# Patient Record
Sex: MALE | URBAN - METROPOLITAN AREA
[De-identification: names, ages, dates, MRNs, and addresses within clinical notes are randomized per-mention and may not be internally consistent; named-entity substitution may affect disease eponyms.]

---
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
## 2023-05-23 ENCOUNTER — APPOINTMENT (RX ONLY)
Dept: URBAN - METROPOLITAN AREA CLINIC 133 | Facility: CLINIC | Age: 23
Setting detail: DERMATOLOGY
End: 2023-05-23

## 2023-05-23 DIAGNOSIS — Z41.9 ENCOUNTER FOR PROCEDURE FOR PURPOSES OTHER THAN REMEDYING HEALTH STATE, UNSPECIFIED: ICD-10-CM

## 2023-05-23 PROCEDURE — ? INVENTORY

## 2023-05-23 PROCEDURE — ? MICRONEEDLING

## 2023-05-23 ASSESSMENT — LOCATION ZONE DERM: LOCATION ZONE: NOSE

## 2023-05-23 ASSESSMENT — LOCATION DETAILED DESCRIPTION DERM
LOCATION DETAILED: NASAL TIP
LOCATION DETAILED: NASAL SUPRATIP

## 2023-05-23 ASSESSMENT — LOCATION SIMPLE DESCRIPTION DERM: LOCATION SIMPLE: NOSE

## 2023-05-23 NOTE — PROCEDURE: MICRONEEDLING
Depth In Mm (Location #1): 0.5
Depth In Mm (Location #6): 2
Price (Use Numbers Only, No Special Characters Or $): 0
Consent: Written consent obtained, risks reviewed including but not limited to pain, scarring, infection and incomplete improvement.  Patient understands the procedure is cosmetic in nature and will require out of pocket payment.
Location #4: nose, upper lip and neck
Location #2: cheeks, chin
Depth In Mm (Location #5): 0.25
Topical Anesthesia?: 23% lidocaine, 7% tetracaine
Post-Care Instructions: -Do not use any skin care products such as moisturizer, cleanser, SPF, etc. for 12 hours post treatment. Avoid sun exposure.\\n-After 24 hours and up to 72 treat your skin as sensitive. Use a gentle cleanser, SPF with Zinc oxide. Do NOT use exfoliators such as (retinol, tretinoin, vitamin c, alpha-hydroxy acids, scrubs) during this time. \\n-Heavy exercise is not recommended for 24 hours. Sweat and heat will agitate the treatment area. \\n-Keep everything clean. Avoid hot tubs, swimming pools, and try not to touch. \\n-COVID: When required to use a facial mask, use a clean, dry mask, preferably paper/ disposable for 48 hours. Wash your hands frequently and prior to touching the treatment area. \\n-High Doe skin types may experience darkening of pigment prior to fading. \\n-Everyone?s reaction is different. Some may experience little to no discomfort, others may be sensitive, swollen and slough. Applying extra moisturizer multiple times daily will help to relieve discomfort. \\n***CONTACT THE OFFICE IMMEDIATELY if pain persists, or signs of infection such as: redness in the treatment, particularly if it spreads or forms a red streak, pus or oozing discharge, or fever occurs.\\n\\nExpected side-effects: \\nDay 1- immediately post treatment: mild to moderate redness, swelling. Skin will feel hot like a sunburn. Do not apply anything to your face (moisturizers, spf, cleansers, makeup etc) for at least 12 hours post procedure. \\n2 Days post treatment: few patients will experience continued redness or swelling. The skin may feel tight, itchy. Do your best not to touch, scratch or pick at your skin. Makeup may be applied. Wear SPF. Gentle cleanser and moisturizer may be applied. \\n3 Days post treatment: most patients will feel back to normal. Sensitive patients may experience itchiness and sensitivity, and mild redness. \\n4 - 7 days post treatment: most treatment side effects have subsided and appearance returned to normal. Regular skin regimen may be resumed. Always perform a patch test before using a product on the full treatment area. If tingling or stinging occurs, rinse with cool water and wait an additional 24 hours to try again.
Depth In Mm (Location #2): 1.25
Depth In Mm (Location #7): 0.1
Length Of Topical Anesthesia Application (Optional): 30 minutes
Location #1: forehead
Detail Level: Zone
Location #3: scars

## 2023-06-29 ENCOUNTER — APPOINTMENT (RX ONLY)
Dept: URBAN - METROPOLITAN AREA CLINIC 133 | Facility: CLINIC | Age: 23
Setting detail: DERMATOLOGY
End: 2023-06-29

## 2023-06-29 DIAGNOSIS — Z41.9 ENCOUNTER FOR PROCEDURE FOR PURPOSES OTHER THAN REMEDYING HEALTH STATE, UNSPECIFIED: ICD-10-CM

## 2023-06-29 PROCEDURE — ? INVENTORY

## 2023-06-29 PROCEDURE — ? MICRONEEDLING

## 2023-06-29 ASSESSMENT — LOCATION DETAILED DESCRIPTION DERM
LOCATION DETAILED: NASAL SUPRATIP
LOCATION DETAILED: NASAL DORSUM

## 2023-06-29 ASSESSMENT — LOCATION ZONE DERM: LOCATION ZONE: NOSE

## 2023-06-29 ASSESSMENT — LOCATION SIMPLE DESCRIPTION DERM: LOCATION SIMPLE: NOSE

## 2023-06-29 NOTE — PROCEDURE: MICRONEEDLING
Price (Use Numbers Only, No Special Characters Or $): 0
Depth In Mm (Location #4): 0.25
Location #2: nose
Post-Care Instructions: -Do not use any skin care products such as moisturizer, cleanser, SPF, etc. for 12 hours post treatment. Avoid sun exposure.\\n-After 24 hours and up to 72 treat your skin as sensitive. Use a gentle cleanser, SPF with Zinc oxide. Do NOT use exfoliators such as (retinol, tretinoin, vitamin c, alpha-hydroxy acids, scrubs) during this time. \\n-Heavy exercise is not recommended for 24 hours. Sweat and heat will agitate the treatment area. \\n-Keep everything clean. Avoid hot tubs, swimming pools, and try not to touch. \\n-COVID: When required to use a facial mask, use a clean, dry mask, preferably paper/ disposable for 48 hours. Wash your hands frequently and prior to touching the treatment area. \\n-High Doe skin types may experience darkening of pigment prior to fading. \\n-Everyone?s reaction is different. Some may experience little to no discomfort, others may be sensitive, swollen and slough. Applying extra moisturizer multiple times daily will help to relieve discomfort. \\n***CONTACT THE OFFICE IMMEDIATELY if pain persists, or signs of infection such as: redness in the treatment, particularly if it spreads or forms a red streak, pus or oozing discharge, or fever occurs.\\n\\nExpected side-effects: \\nDay 1- immediately post treatment: mild to moderate redness, swelling. Skin will feel hot like a sunburn. Do not apply anything to your face (moisturizers, spf, cleansers, makeup etc) for at least 12 hours post procedure. \\n2 Days post treatment: few patients will experience continued redness or swelling. The skin may feel tight, itchy. Do your best not to touch, scratch or pick at your skin. Makeup may be applied. Wear SPF. Gentle cleanser and moisturizer may be applied. \\n3 Days post treatment: most patients will feel back to normal. Sensitive patients may experience itchiness and sensitivity, and mild redness. \\n4 - 7 days post treatment: most treatment side effects have subsided and appearance returned to normal. Regular skin regimen may be resumed. Always perform a patch test before using a product on the full treatment area. If tingling or stinging occurs, rinse with cool water and wait an additional 24 hours to try again.
Topical Anesthesia?: 23% lidocaine, 7% tetracaine
Location #3: cheeks, chin, scars
Depth In Mm (Location #1): 1.25
Length Of Topical Anesthesia Application (Optional): 20 minutes
Depth In Mm (Location #6): 0.5
Consent: Written consent obtained, risks reviewed including but not limited to pain, scarring, infection and incomplete improvement.  Patient understands the procedure is cosmetic in nature and will require out of pocket payment.
Location #4: orbital eye area
Location #5: upper lip
Detail Level: Zone
Depth In Mm (Location #3): 2
Location #1: forehead
Depth In Mm (Location #7): 0.1

## 2023-08-01 ENCOUNTER — APPOINTMENT (RX ONLY)
Dept: URBAN - METROPOLITAN AREA CLINIC 133 | Facility: CLINIC | Age: 23
Setting detail: DERMATOLOGY
End: 2023-08-01

## 2023-08-01 DIAGNOSIS — Z41.9 ENCOUNTER FOR PROCEDURE FOR PURPOSES OTHER THAN REMEDYING HEALTH STATE, UNSPECIFIED: ICD-10-CM

## 2023-08-01 PROCEDURE — ? MICRONEEDLING

## 2023-08-01 PROCEDURE — ? INVENTORY

## 2023-08-01 ASSESSMENT — LOCATION SIMPLE DESCRIPTION DERM
LOCATION SIMPLE: LEFT FOREHEAD
LOCATION SIMPLE: NOSE

## 2023-08-01 ASSESSMENT — LOCATION ZONE DERM
LOCATION ZONE: NOSE
LOCATION ZONE: FACE

## 2023-08-01 ASSESSMENT — LOCATION DETAILED DESCRIPTION DERM
LOCATION DETAILED: NASAL SUPRATIP
LOCATION DETAILED: LEFT MEDIAL FOREHEAD

## 2023-08-01 NOTE — PROCEDURE: MICRONEEDLING
Price (Use Numbers Only, No Special Characters Or $): 0
Depth In Mm (Location #1): 0.5
Post-Care Instructions: -Do not use any skin care products such as moisturizer, cleanser, SPF, etc. for 12 hours post treatment. Avoid sun exposure.\\n-After 24 hours and up to 72 treat your skin as sensitive. Use a gentle cleanser, SPF with Zinc oxide. Do NOT use exfoliators such as (retinol, tretinoin, vitamin c, alpha-hydroxy acids, scrubs) during this time. \\n-Heavy exercise is not recommended for 24 hours. Sweat and heat will agitate the treatment area. \\n-Keep everything clean. Avoid hot tubs, swimming pools, and try not to touch. \\n-COVID: When required to use a facial mask, use a clean, dry mask, preferably paper/ disposable for 48 hours. Wash your hands frequently and prior to touching the treatment area. \\n-High Doe skin types may experience darkening of pigment prior to fading. \\n-Everyone?s reaction is different. Some may experience little to no discomfort, others may be sensitive, swollen and slough. Applying extra moisturizer multiple times daily will help to relieve discomfort. \\n***CONTACT THE OFFICE IMMEDIATELY if pain persists, or signs of infection such as: redness in the treatment, particularly if it spreads or forms a red streak, pus or oozing discharge, or fever occurs.\\n\\nExpected side-effects: \\nDay 1- immediately post treatment: mild to moderate redness, swelling. Skin will feel hot like a sunburn. Do not apply anything to your face (moisturizers, spf, cleansers, makeup etc) for at least 12 hours post procedure. \\n2 Days post treatment: few patients will experience continued redness or swelling. The skin may feel tight, itchy. Do your best not to touch, scratch or pick at your skin. Makeup may be applied. Wear SPF. Gentle cleanser and moisturizer may be applied. \\n3 Days post treatment: most patients will feel back to normal. Sensitive patients may experience itchiness and sensitivity, and mild redness. \\n4 - 7 days post treatment: most treatment side effects have subsided and appearance returned to normal. Regular skin regimen may be resumed. Always perform a patch test before using a product on the full treatment area. If tingling or stinging occurs, rinse with cool water and wait an additional 24 hours to try again.
Location #2: cheeks and chin
Depth In Mm (Location #6): 0.25
Treatment Number (Optional): 3
Location #1: nose
Length Of Topical Anesthesia Application (Optional): 20 minutes
Topical Anesthesia?: 23% lidocaine, 7% tetracaine
Depth In Mm (Location #4): 1.25
Detail Level: Zone
Location #3: scars
Depth In Mm (Location #7): 0.1
Consent: Written consent obtained, risks reviewed including but not limited to pain, scarring, infection and incomplete improvement.  Patient understands the procedure is cosmetic in nature and will require out of pocket payment.
Depth In Mm (Location #3): 2
Location #4: forehead

## 2023-10-03 ENCOUNTER — APPOINTMENT (RX ONLY)
Dept: URBAN - METROPOLITAN AREA CLINIC 133 | Facility: CLINIC | Age: 23
Setting detail: DERMATOLOGY
End: 2023-10-03

## 2023-10-03 DIAGNOSIS — Z41.9 ENCOUNTER FOR PROCEDURE FOR PURPOSES OTHER THAN REMEDYING HEALTH STATE, UNSPECIFIED: ICD-10-CM

## 2023-10-03 PROCEDURE — ? INVENTORY

## 2023-10-03 PROCEDURE — ? MICRONEEDLING

## 2023-10-03 ASSESSMENT — LOCATION ZONE DERM
LOCATION ZONE: NOSE
LOCATION ZONE: FACE

## 2023-10-03 ASSESSMENT — LOCATION DETAILED DESCRIPTION DERM
LOCATION DETAILED: NASAL DORSUM
LOCATION DETAILED: LEFT MEDIAL FOREHEAD

## 2023-10-03 ASSESSMENT — LOCATION SIMPLE DESCRIPTION DERM
LOCATION SIMPLE: NOSE
LOCATION SIMPLE: LEFT FOREHEAD

## 2023-10-03 NOTE — PROCEDURE: MICRONEEDLING
Topical Anesthesia?: 23% lidocaine, 7% tetracaine
Depth In Mm (Location #4): 0.25
Detail Level: Zone
Location #2: forehead
Consent: Written consent obtained, risks reviewed including but not limited to pain, scarring, infection and incomplete improvement.  Patient understands the procedure is cosmetic in nature and will require out of pocket payment.
Location #1: scars, cheeks
Length Of Topical Anesthesia Application (Optional): 20 minutes
Treatment Number (Optional): 4
Depth In Mm (Location #3): 0.5
Location #4: orbital eye area,jawline
Depth In Mm (Location #2): 0.75
Depth In Mm (Location #1): 2
Price (Use Numbers Only, No Special Characters Or $): 0
Location #3: nose and upper lip
Post-Care Instructions: -Do not use any skin care products such as moisturizer, cleanser, SPF, etc. for 12 hours post treatment. Avoid sun exposure.\\n-After 24 hours and up to 72 treat your skin as sensitive. Use a gentle cleanser, SPF with Zinc oxide. Do NOT use exfoliators such as (retinol, tretinoin, vitamin c, alpha-hydroxy acids, scrubs) during this time. \\n-Heavy exercise is not recommended for 24 hours. Sweat and heat will agitate the treatment area. \\n-Keep everything clean. Avoid hot tubs, swimming pools, and try not to touch. \\n-COVID: When required to use a facial mask, use a clean, dry mask, preferably paper/ disposable for 48 hours. Wash your hands frequently and prior to touching the treatment area. \\n-High Doe skin types may experience darkening of pigment prior to fading. \\n-Everyone?s reaction is different. Some may experience little to no discomfort, others may be sensitive, swollen and slough. Applying extra moisturizer multiple times daily will help to relieve discomfort. \\n***CONTACT THE OFFICE IMMEDIATELY if pain persists, or signs of infection such as: redness in the treatment, particularly if it spreads or forms a red streak, pus or oozing discharge, or fever occurs.\\n\\nExpected side-effects: \\nDay 1- immediately post treatment: mild to moderate redness, swelling. Skin will feel hot like a sunburn. Do not apply anything to your face (moisturizers, spf, cleansers, makeup etc) for at least 12 hours post procedure. \\n2 Days post treatment: few patients will experience continued redness or swelling. The skin may feel tight, itchy. Do your best not to touch, scratch or pick at your skin. Makeup may be applied. Wear SPF. Gentle cleanser and moisturizer may be applied. \\n3 Days post treatment: most patients will feel back to normal. Sensitive patients may experience itchiness and sensitivity, and mild redness. \\n4 - 7 days post treatment: most treatment side effects have subsided and appearance returned to normal. Regular skin regimen may be resumed. Always perform a patch test before using a product on the full treatment area. If tingling or stinging occurs, rinse with cool water and wait an additional 24 hours to try again.

## 2023-11-09 ENCOUNTER — APPOINTMENT (RX ONLY)
Dept: URBAN - METROPOLITAN AREA CLINIC 133 | Facility: CLINIC | Age: 23
Setting detail: DERMATOLOGY
End: 2023-11-09

## 2023-11-09 DIAGNOSIS — Z41.9 ENCOUNTER FOR PROCEDURE FOR PURPOSES OTHER THAN REMEDYING HEALTH STATE, UNSPECIFIED: ICD-10-CM

## 2023-11-09 PROCEDURE — ? MICRONEEDLING

## 2023-11-09 PROCEDURE — ? INVENTORY

## 2023-11-09 ASSESSMENT — LOCATION ZONE DERM
LOCATION ZONE: FACE
LOCATION ZONE: NOSE

## 2023-11-09 ASSESSMENT — LOCATION DETAILED DESCRIPTION DERM
LOCATION DETAILED: NASAL TIP
LOCATION DETAILED: LEFT INFERIOR MEDIAL FOREHEAD

## 2023-11-09 ASSESSMENT — LOCATION SIMPLE DESCRIPTION DERM
LOCATION SIMPLE: LEFT FOREHEAD
LOCATION SIMPLE: NOSE

## 2023-11-09 NOTE — PROCEDURE: MICRONEEDLING
Depth In Mm (Location #5): 0.75
Depth In Mm (Location #3): 2
Treatment Number (Optional): 0
Location #4: nose
Detail Level: Zone
Post-Care Instructions: -Do not use any skin care products such as moisturizer, cleanser, SPF, etc. for 12 hours post treatment. Avoid sun exposure.\\n-After 24 hours and up to 72 treat your skin as sensitive. Use a gentle cleanser, SPF with Zinc oxide. Do NOT use exfoliators such as (retinol, tretinoin, vitamin c, alpha-hydroxy acids, scrubs) during this time. \\n-Heavy exercise is not recommended for 24 hours. Sweat and heat will agitate the treatment area. \\n-Keep everything clean. Avoid hot tubs, swimming pools, and try not to touch. \\n-COVID: When required to use a facial mask, use a clean, dry mask, preferably paper/ disposable for 48 hours. Wash your hands frequently and prior to touching the treatment area. \\n-High Doe skin types may experience darkening of pigment prior to fading. \\n-Everyone?s reaction is different. Some may experience little to no discomfort, others may be sensitive, swollen and slough. Applying extra moisturizer multiple times daily will help to relieve discomfort. \\n***CONTACT THE OFFICE IMMEDIATELY if pain persists, or signs of infection such as: redness in the treatment, particularly if it spreads or forms a red streak, pus or oozing discharge, or fever occurs.\\n\\nExpected side-effects: \\nDay 1- immediately post treatment: mild to moderate redness, swelling. Skin will feel hot like a sunburn. Do not apply anything to your face (moisturizers, spf, cleansers, makeup etc) for at least 12 hours post procedure. \\n2 Days post treatment: few patients will experience continued redness or swelling. The skin may feel tight, itchy. Do your best not to touch, scratch or pick at your skin. Makeup may be applied. Wear SPF. Gentle cleanser and moisturizer may be applied. \\n3 Days post treatment: most patients will feel back to normal. Sensitive patients may experience itchiness and sensitivity, and mild redness. \\n4 - 7 days post treatment: most treatment side effects have subsided and appearance returned to normal. Regular skin regimen may be resumed. Always perform a patch test before using a product on the full treatment area. If tingling or stinging occurs, rinse with cool water and wait an additional 24 hours to try again.
Depth In Mm (Location #7): 0.5
Location #2: cheeks and chin
Location #3: cheeks, chin, scars
Location #1: forehead
Length Of Topical Anesthesia Application (Optional): 20 minutes
Consent: Written consent obtained, risks reviewed including but not limited to pain, scarring, infection and incomplete improvement.  Patient understands the procedure is cosmetic in nature and will require out of pocket payment.
Topical Anesthesia?: 23% lidocaine, 7% tetracaine
Location #5: upper lip
Depth In Mm (Location #2): 1.25

## 2023-12-12 ENCOUNTER — APPOINTMENT (RX ONLY)
Dept: URBAN - METROPOLITAN AREA CLINIC 133 | Facility: CLINIC | Age: 23
Setting detail: DERMATOLOGY
End: 2023-12-12

## 2023-12-12 DIAGNOSIS — Z41.9 ENCOUNTER FOR PROCEDURE FOR PURPOSES OTHER THAN REMEDYING HEALTH STATE, UNSPECIFIED: ICD-10-CM

## 2023-12-12 PROCEDURE — ? MICRONEEDLING

## 2023-12-12 PROCEDURE — ? INVENTORY

## 2023-12-12 ASSESSMENT — LOCATION ZONE DERM: LOCATION ZONE: NOSE

## 2023-12-12 ASSESSMENT — LOCATION DETAILED DESCRIPTION DERM: LOCATION DETAILED: NASAL SUPRATIP

## 2023-12-12 ASSESSMENT — LOCATION SIMPLE DESCRIPTION DERM: LOCATION SIMPLE: NOSE

## 2023-12-12 NOTE — PROCEDURE: MICRONEEDLING
Treatment Number (Optional): 0
Location #4: scars
Depth In Mm (Location #1): 0.5
Location #5: nose
Post-Care Instructions: -Do not use any skin care products such as moisturizer, cleanser, SPF, etc. for 12 hours post treatment. Avoid sun exposure.\\n-After 24 hours and up to 72 treat your skin as sensitive. Use a gentle cleanser, SPF with Zinc oxide. Do NOT use exfoliators such as (retinol, tretinoin, vitamin c, alpha-hydroxy acids, scrubs) during this time. \\n-Heavy exercise is not recommended for 24 hours. Sweat and heat will agitate the treatment area. \\n-Keep everything clean. Avoid hot tubs, swimming pools, and try not to touch. \\n-COVID: When required to use a facial mask, use a clean, dry mask, preferably paper/ disposable for 48 hours. Wash your hands frequently and prior to touching the treatment area. \\n-High Doe skin types may experience darkening of pigment prior to fading. \\n-Everyone?s reaction is different. Some may experience little to no discomfort, others may be sensitive, swollen and slough. Applying extra moisturizer multiple times daily will help to relieve discomfort. \\n***CONTACT THE OFFICE IMMEDIATELY if pain persists, or signs of infection such as: redness in the treatment, particularly if it spreads or forms a red streak, pus or oozing discharge, or fever occurs.\\n\\nExpected side-effects: \\nDay 1- immediately post treatment: mild to moderate redness, swelling. Skin will feel hot like a sunburn. Do not apply anything to your face (moisturizers, spf, cleansers, makeup etc) for at least 12 hours post procedure. \\n2 Days post treatment: few patients will experience continued redness or swelling. The skin may feel tight, itchy. Do your best not to touch, scratch or pick at your skin. Makeup may be applied. Wear SPF. Gentle cleanser and moisturizer may be applied. \\n3 Days post treatment: most patients will feel back to normal. Sensitive patients may experience itchiness and sensitivity, and mild redness. \\n4 - 7 days post treatment: most treatment side effects have subsided and appearance returned to normal. Regular skin regimen may be resumed. Always perform a patch test before using a product on the full treatment area. If tingling or stinging occurs, rinse with cool water and wait an additional 24 hours to try again.
Topical Anesthesia?: 23% lidocaine, 7% tetracaine
Length Of Topical Anesthesia Application (Optional): 30 minutes
Location #1: forehead
Consent: Written consent obtained, risks reviewed including but not limited to pain, scarring, infection and incomplete improvement.  Patient understands the procedure is cosmetic in nature and will require out of pocket payment.
Location #2: cheeks and chin
Location #3: upper lip
Depth In Mm (Location #4): 2
Depth In Mm (Location #2): 1.25
Detail Level: Zone
Depth In Mm (Location #3): 0.75